# Patient Record
Sex: MALE | Race: WHITE | NOT HISPANIC OR LATINO | Employment: UNEMPLOYED | URBAN - NONMETROPOLITAN AREA
[De-identification: names, ages, dates, MRNs, and addresses within clinical notes are randomized per-mention and may not be internally consistent; named-entity substitution may affect disease eponyms.]

---

## 2017-01-21 ENCOUNTER — LAB (OUTPATIENT)
Dept: LAB | Facility: HOSPITAL | Age: 27
End: 2017-01-21
Attending: PEDIATRICS

## 2017-01-21 ENCOUNTER — TRANSCRIBE ORDERS (OUTPATIENT)
Dept: GENERAL RADIOLOGY | Facility: HOSPITAL | Age: 27
End: 2017-01-21

## 2017-01-21 DIAGNOSIS — Z11.3 ENCOUNTER FOR SCREENING FOR INFECTIONS WITH A PREDOMINANTLY SEXUAL MODE OF TRANSMISSION: ICD-10-CM

## 2017-01-21 DIAGNOSIS — Z11.3 ENCOUNTER FOR SCREENING FOR INFECTIONS WITH A PREDOMINANTLY SEXUAL MODE OF TRANSMISSION: Primary | ICD-10-CM

## 2017-01-21 PROCEDURE — 87590 N.GONORRHOEAE DNA DIR PROB: CPT | Performed by: PEDIATRICS

## 2017-01-21 PROCEDURE — 86695 HERPES SIMPLEX TYPE 1 TEST: CPT | Performed by: PEDIATRICS

## 2017-01-21 PROCEDURE — 86696 HERPES SIMPLEX TYPE 2 TEST: CPT | Performed by: PEDIATRICS

## 2017-01-21 PROCEDURE — 36415 COLL VENOUS BLD VENIPUNCTURE: CPT

## 2017-01-21 PROCEDURE — G0432 EIA HIV-1/HIV-2 SCREEN: HCPCS | Performed by: PEDIATRICS

## 2017-01-21 PROCEDURE — 87491 CHLMYD TRACH DNA AMP PROBE: CPT | Performed by: PEDIATRICS

## 2017-01-21 PROCEDURE — 80074 ACUTE HEPATITIS PANEL: CPT | Performed by: PEDIATRICS

## 2017-01-22 LAB
HAV IGM SERPL QL IA: NEGATIVE
HBV CORE IGM SERPL QL IA: NEGATIVE
HBV SURFACE AG SERPL QL IA: NEGATIVE
HCV AB SER DONR QL: NEGATIVE
HCV S/C RATIO: 0.02 (ref 0–0.99)
HIV1+2 AB SER QL: NEGATIVE

## 2017-01-24 LAB
C TRACH RRNA SPEC DONR QL NAA+PROBE: NEGATIVE
HSV1 IGG SER IA-ACNC: 38.8 INDEX (ref 0–0.9)
HSV2 IGG SER IA-ACNC: <0.91 INDEX (ref 0–0.9)
N GONORRHOEA DNA SPEC QL NAA+PROBE: NEGATIVE
RPR SER-TITR: NON REACTIVE {TITER}

## 2019-04-05 ENCOUNTER — HOSPITAL ENCOUNTER (INPATIENT)
Age: 29
LOS: 3 days | Discharge: HOME | DRG: 897 | End: 2019-04-08
Attending: FAMILY MEDICINE | Admitting: PSYCHIATRY & NEUROLOGY
Payer: MEDICAID

## 2019-04-05 DIAGNOSIS — F23 ACUTE PSYCHOSIS (HCC): Primary | ICD-10-CM

## 2019-04-05 DIAGNOSIS — F19.959 DRUG-INDUCED PSYCHOTIC DISORDER WITH COMPLICATION (HCC): ICD-10-CM

## 2019-04-05 LAB
ACETAMINOPHEN LEVEL: <15 UG/ML
ALBUMIN SERPL-MCNC: 4.2 G/DL (ref 3.5–5.2)
ALP BLD-CCNC: 81 U/L (ref 40–130)
ALT SERPL-CCNC: 15 U/L (ref 5–41)
AMPHETAMINE SCREEN, URINE: POSITIVE
ANION GAP SERPL CALCULATED.3IONS-SCNC: 12 MMOL/L (ref 7–19)
AST SERPL-CCNC: 15 U/L (ref 5–40)
BARBITURATE SCREEN URINE: NEGATIVE
BASOPHILS ABSOLUTE: 0.1 K/UL (ref 0–0.2)
BASOPHILS RELATIVE PERCENT: 1.1 % (ref 0–1)
BENZODIAZEPINE SCREEN, URINE: NEGATIVE
BILIRUB SERPL-MCNC: 0.3 MG/DL (ref 0.2–1.2)
BUN BLDV-MCNC: 19 MG/DL (ref 6–20)
CALCIUM SERPL-MCNC: 9.3 MG/DL (ref 8.6–10)
CANNABINOID SCREEN URINE: POSITIVE
CHLORIDE BLD-SCNC: 107 MMOL/L (ref 98–111)
CO2: 23 MMOL/L (ref 22–29)
COCAINE METABOLITE SCREEN URINE: NEGATIVE
CREAT SERPL-MCNC: 1 MG/DL (ref 0.5–1.2)
EOSINOPHILS ABSOLUTE: 0.2 K/UL (ref 0–0.6)
EOSINOPHILS RELATIVE PERCENT: 2.3 % (ref 0–5)
ETHANOL: <10 MG/DL (ref 0–0.08)
GFR NON-AFRICAN AMERICAN: >60
GLUCOSE BLD-MCNC: 101 MG/DL (ref 74–109)
HCT VFR BLD CALC: 45.1 % (ref 42–52)
HEMOGLOBIN: 15.4 G/DL (ref 14–18)
LYMPHOCYTES ABSOLUTE: 3.3 K/UL (ref 1.1–4.5)
LYMPHOCYTES RELATIVE PERCENT: 41.8 % (ref 20–40)
Lab: ABNORMAL
MCH RBC QN AUTO: 30.1 PG (ref 27–31)
MCHC RBC AUTO-ENTMCNC: 34.1 G/DL (ref 33–37)
MCV RBC AUTO: 88.1 FL (ref 80–94)
MONOCYTES ABSOLUTE: 0.8 K/UL (ref 0–0.9)
MONOCYTES RELATIVE PERCENT: 10.2 % (ref 0–10)
NEUTROPHILS ABSOLUTE: 3.5 K/UL (ref 1.5–7.5)
NEUTROPHILS RELATIVE PERCENT: 44.5 % (ref 50–65)
OPIATE SCREEN URINE: NEGATIVE
PDW BLD-RTO: 12.1 % (ref 11.5–14.5)
PLATELET # BLD: 235 K/UL (ref 130–400)
PMV BLD AUTO: 9.5 FL (ref 9.4–12.4)
POTASSIUM SERPL-SCNC: 4.2 MMOL/L (ref 3.5–5)
RBC # BLD: 5.12 M/UL (ref 4.7–6.1)
SALICYLATE, SERUM: <3 MG/DL (ref 3–10)
SODIUM BLD-SCNC: 142 MMOL/L (ref 136–145)
T4 TOTAL: 8.6 UG/DL (ref 4.5–11.7)
TOTAL PROTEIN: 7.2 G/DL (ref 6.6–8.7)
TSH SERPL DL<=0.05 MIU/L-ACNC: 2.85 UIU/ML (ref 0.27–4.2)
WBC # BLD: 7.9 K/UL (ref 4.8–10.8)

## 2019-04-05 PROCEDURE — G0480 DRUG TEST DEF 1-7 CLASSES: HCPCS

## 2019-04-05 PROCEDURE — 99285 EMERGENCY DEPT VISIT HI MDM: CPT | Performed by: EMERGENCY MEDICINE

## 2019-04-05 PROCEDURE — 36415 COLL VENOUS BLD VENIPUNCTURE: CPT

## 2019-04-05 PROCEDURE — 85025 COMPLETE CBC W/AUTO DIFF WBC: CPT

## 2019-04-05 PROCEDURE — 84443 ASSAY THYROID STIM HORMONE: CPT

## 2019-04-05 PROCEDURE — 80053 COMPREHEN METABOLIC PANEL: CPT

## 2019-04-05 PROCEDURE — 80307 DRUG TEST PRSMV CHEM ANLYZR: CPT

## 2019-04-05 PROCEDURE — 6370000000 HC RX 637 (ALT 250 FOR IP): Performed by: FAMILY MEDICINE

## 2019-04-05 PROCEDURE — 84436 ASSAY OF TOTAL THYROXINE: CPT

## 2019-04-05 PROCEDURE — 1240000000 HC EMOTIONAL WELLNESS R&B

## 2019-04-05 PROCEDURE — 99285 EMERGENCY DEPT VISIT HI MDM: CPT

## 2019-04-05 RX ORDER — OLANZAPINE 10 MG/1
10 TABLET, ORALLY DISINTEGRATING ORAL ONCE
Status: COMPLETED | OUTPATIENT
Start: 2019-04-05 | End: 2019-04-05

## 2019-04-05 RX ORDER — ACETAMINOPHEN 325 MG/1
650 TABLET ORAL EVERY 4 HOURS PRN
Status: DISCONTINUED | OUTPATIENT
Start: 2019-04-05 | End: 2019-04-08 | Stop reason: HOSPADM

## 2019-04-05 RX ORDER — ZIPRASIDONE MESYLATE 20 MG/ML
20 INJECTION, POWDER, LYOPHILIZED, FOR SOLUTION INTRAMUSCULAR ONCE
Status: DISCONTINUED | OUTPATIENT
Start: 2019-04-05 | End: 2019-04-05

## 2019-04-05 RX ORDER — LORAZEPAM 2 MG/ML
2 INJECTION INTRAMUSCULAR EVERY 6 HOURS PRN
Status: DISCONTINUED | OUTPATIENT
Start: 2019-04-05 | End: 2019-04-08 | Stop reason: HOSPADM

## 2019-04-05 RX ORDER — OLANZAPINE 10 MG/1
10 TABLET, ORALLY DISINTEGRATING ORAL NIGHTLY
Status: DISCONTINUED | OUTPATIENT
Start: 2019-04-05 | End: 2019-04-05

## 2019-04-05 RX ORDER — ZIPRASIDONE MESYLATE 20 MG/ML
10 INJECTION, POWDER, LYOPHILIZED, FOR SOLUTION INTRAMUSCULAR EVERY 6 HOURS PRN
Status: DISCONTINUED | OUTPATIENT
Start: 2019-04-05 | End: 2019-04-08 | Stop reason: HOSPADM

## 2019-04-05 RX ORDER — OLANZAPINE 10 MG/1
TABLET, ORALLY DISINTEGRATING ORAL
Status: DISCONTINUED
Start: 2019-04-05 | End: 2019-04-05

## 2019-04-05 RX ADMIN — OLANZAPINE 10 MG: 10 TABLET, ORALLY DISINTEGRATING ORAL at 05:07

## 2019-04-05 ASSESSMENT — ENCOUNTER SYMPTOMS
WHEEZING: 0
NAUSEA: 0
SORE THROAT: 0
SHORTNESS OF BREATH: 0
ABDOMINAL PAIN: 0
DIARRHEA: 0
COLOR CHANGE: 0
COUGH: 0
VOMITING: 0
BACK PAIN: 0

## 2019-04-05 NOTE — ED PROVIDER NOTES
[facility]  eMERGENCYdEPARTMENT eNCOUnter      Pt Name: Queen Don  MRN: [id number]  [place] 1990  Date of evaluation: 4/5/2019  Shawn Buchanan MD    Emergency Department care of this patient was assumed at 0700 from Dr. Arnol Zavala. We have discussed the case and the plan of care. I have seen and evaluated patient and reviewed ED course. CHIEF COMPLAINT       Chief Complaint   Patient presents with    Manic Behavior    Psychiatric Evaluation    Paranoid     On meth, paranoid, grandiose thoughts, assuming care from Dr. Arnol Zavala, pending mental health eval    PHYSICAL EXAM    (up to 7 for level 4, 8 or more for level 5)     ED Triage Vitals   BP Temp Temp src Pulse Resp SpO2 Height Weight   04/05/19 0506 04/05/19 0506 -- 04/05/19 0506 04/05/19 0506 04/05/19 0506 04/05/19 0502 04/05/19 0502   (!) 147/88 97.2 °F (36.2 °C)  97 22 97 % 5' 11\" (1.803 m) 150 lb (68 kg)       Physical Exam   Constitutional: He appears well-developed and well-nourished. No distress. sleeping   Vitals reviewed. I did not re examine the patient    DIAGNOSTIC RESULTS         No orders to display           LABS:  Labs Reviewed   CBC WITH AUTO DIFFERENTIAL - Abnormal; Notable for the following components:       Result Value    Neutrophils % 44.5 (*)     Lymphocytes % 41.8 (*)     Monocytes % 10.2 (*)     Basophils % 1.1 (*)     All other components within normal limits   URINE DRUG SCREEN - Abnormal; Notable for the following components:    Amphetamine Screen, Urine Positive (*)     Cannabinoid Scrn, Ur Positive (*)     All other components within normal limits   COMPREHENSIVE METABOLIC PANEL   ETHANOL   ACETAMINOPHEN LEVEL   T4   SALICYLATE LEVEL   TSH WITHOUT REFLEX       All other labs were within normal range or not returned as of this dictation.     EMERGENCY DEPARTMENT COURSE and DIFFERENTIAL DIAGNOSIS/MDM:   Vitals:    Vitals:    04/05/19 0502 04/05/19 0506   BP:  (!) 147/88   Pulse:  97   Resp:  22   Temp:  97.2 °F (36.2 °C)   SpO2:  97%   Weight: 150 lb (68 kg)    Height: 5' 11\" (1.803 m)        MDM  Number of Diagnoses or Management Options  Acute psychosis ([facility].):   Drug-induced psychotic disorder with complication [facility]):      Amount and/or Complexity of Data Reviewed  Clinical lab tests: reviewed  Discuss the patient with other providers: yes      Methamphetamine induced psychosis, pt sleeping currently however when [facility] tried to evaluate very disorganized, 0748    [facility] to have Dr. Dong Gauthier evaluate in ED 5139    Dr. Dong Gauthier evaluated in ED ok to admit here, pt placed on 72hr hold      CONSULTS:  [place]:  Unless otherwise noted below, none     Procedures    FINAL IMPRESSION      1. Acute psychosis ([facility]    2. Drug-induced psychotic disorder with complication ([facility]          DISPOSITION/PLAN   DISPOSITION     PATIENT REFERRED TO:  No follow-up provider specified.     DISCHARGE MEDICATIONS:  New Prescriptions    No medications on file          (Please note that portions ofthis note were completed with a voice recognition program.  Efforts were made to edit the dictations but occasionally words are mis-transcribed.)    Eliel Hollis MD(electronically signed)  Attending Emergency Physician         Hazel Montano MD  04/05/19 1326

## 2019-04-05 NOTE — ED PROVIDER NOTES
[facility] 6 ADULT [facility]  eMERGENCY dEPARTMENT eNCOUnter      Pt Name: Nimco Ramirez  MRN: [id number]  [place] 1990  Date of evaluation: 4/5/2019  Provider: MD Raj Webster       Chief Complaint   Patient presents with    Manic Behavior    Psychiatric Evaluation    Paranoid         HISTORY OF PRESENT ILLNESS   (Location/Symptom, Timing/Onset,Context/Setting, Quality, Duration, Modifying Factors, Severity)  Note limiting factors. Nimco Ramriez is a 29 y.o. male who presents to the emergency department      Patient presents and agitated manic state he has rapid speech and is getting that he is very paranoid          NursingNotes were reviewed. REVIEW OF SYSTEMS    (2-9 systems for level 4, 10 or more for level 5)     Review of Systems   Constitutional: Positive for fatigue. Negative for activity change, appetite change, chills and fever. HENT: Negative for nosebleeds, postnasal drip and sore throat. Respiratory: Negative for cough, shortness of breath and wheezing. Cardiovascular: Negative for chest pain. Gastrointestinal: Negative for abdominal pain, diarrhea, nausea and vomiting. Genitourinary: Negative for dysuria. Musculoskeletal: Negative for back pain. Skin: Negative for color change. Neurological: Negative for weakness and headaches. Psychiatric/Behavioral: Positive for decreased concentration, dysphoric mood and sleep disturbance. The patient is nervous/anxious. PAST MEDICALHISTORY     Past Medical History:   Diagnosis Date    ADHD          SURGICAL HISTORY     No past surgical history on file. CURRENT MEDICATIONS     There are no discharge medications for this patient. ALLERGIES     Pcn [penicillins]    FAMILY HISTORY     No family history on file.        SOCIAL HISTORY       Social History     Socioeconomic History    Marital status: Single     Spouse name: Not on file    Number of children: Not on file    Years of education: Not on file    Highest education level: Not on file   Occupational History    Not on file   Social Needs    Financial resource strain: Not on file    Food insecurity:     Worry: Not on file     Inability: Not on file    Transportation needs:     Medical: Not on file     Non-medical: Not on file   Tobacco Use    Smoking status: Former Smoker    Smokeless tobacco: Never Used   Substance and Sexual Activity    Alcohol use: Not Currently    Drug use: Yes     Types: Methamphetamines, Marijuana    Sexual activity: Not on file   Lifestyle    Physical activity:     Days per week: Not on file     Minutes per session: Not on file    Stress: Not on file   Relationships    Social connections:     Talks on phone: Not on file     Gets together: Not on file     Attends Buddhist service: Not on file     Active member of club or organization: Not on file     Attends meetings of clubs or organizations: Not on file     Relationship status: Not on file    Intimate partner violence:     Fear of current or ex partner: Not on file     Emotionally abused: Not on file     Physically abused: Not on file     Forced sexual activity: Not on file   Other Topics Concern    Not on file   Social History Narrative    Not on file       SCREENINGS    [place] Coma Scale  Eye Opening: Spontaneous  Best Verbal Response: Oriented  Best Motor Response: Obeys commands  Anne Coma Scale Score: 15        PHYSICAL EXAM    (up to 7 for level 4, 8 or more for level 5)     ED Triage Vitals   BP Temp Temp src Pulse Resp SpO2 Height Weight   -- -- -- -- -- -- -- --       Physical Exam   Constitutional: He appears well-developed and well-nourished. HENT:   Head: Normocephalic. Neck: Normal range of motion. Cardiovascular: Normal rate and normal heart sounds. Pulmonary/Chest: Effort normal and breath sounds normal.   Abdominal: Soft. Bowel sounds are normal.   Neurological: He is alert. No cranial nerve deficit.    Psychiatric: His mood appears anxious. His speech is rapid and/or pressured and tangential. He is agitated and hyperactive. Thought content is paranoid and delusional. He expresses impulsivity and inappropriate judgment. He expresses no suicidal ideation. DIAGNOSTIC RESULTS     EKG: All EKG's areinterpreted by the Emergency Department Physician who either signs or Co-signs this chart in the absence of a cardiologist.        RADIOLOGY:  Non-plain film images such as CT, Ultrasound and MRI are read by the radiologist. Plain radiographic images are visualized and preliminarily interpreted bythe emergency physician with the below findings:          No orders to display           LABS:  Labs Reviewed   CBC WITH AUTO DIFFERENTIAL - Abnormal; Notable for the following components:       Result Value    Neutrophils % 44.5 (*)     Lymphocytes % 41.8 (*)     Monocytes % 10.2 (*)     Basophils % 1.1 (*)     All other components within normal limits   URINE DRUG SCREEN - Abnormal; Notable for the following components:    Amphetamine Screen, Urine Positive (*)     Cannabinoid Scrn, Ur Positive (*)     All other components within normal limits   VITAMIN D 25 HYDROXY - Abnormal; Notable for the following components:    Vit D, 25-Hydroxy 28.1 (*)     All other components within normal limits   LIPID PANEL - Abnormal; Notable for the following components:    Cholesterol, Total 143 (*)     HDL 40 (*)     All other components within normal limits   COMPREHENSIVE METABOLIC PANEL   ETHANOL   ACETAMINOPHEN LEVEL   T4   SALICYLATE LEVEL   TSH WITHOUT REFLEX   VITAMIN B12   HEMOGLOBIN A1C       All other labs were within normal range or not returned as of this dictation.     EMERGENCY DEPARTMENT COURSE and DIFFERENTIAL DIAGNOSIS/MDM:   Vitals:    Vitals:    04/06/19 2032 04/07/19 0824 04/07/19 2009 04/08/19 0755   BP: 118/65 131/63 125/68 121/62   Pulse: 81 60 84 69   Resp: 20 18 20 20   Temp: 97.7 °F (36.5 °C) 97.2 °F (36.2 °C) 98.1 °F (36.7 °C) 97.4 °F (36.3 °C) TempSrc: Temporal Temporal Temporal Temporal   SpO2: 99% 98% 97% 100%   Weight:       Height:           MDM    Reassessment  6:10 AM patient is continuing to escalate he is now cursing and having more delusions of grandeur with Amish persecution the Zyprexa has not seem to help, I have ordered a Geodon shot    CONSULTS:  IP CONSULT TO FAMILY MEDICINE    PROCEDURES:  Unless otherwise noted below, none     Procedures    FINAL IMPRESSION      1. Acute psychosis ([address]    2. Drug-induced psychotic disorder with complication [facility])          DISPOSITION/PLAN   DISPOSITION Decision To Admit 04/05/2019 09:55:26 AM      PATIENT REFERRED TO:  Emily Sanderson  [address] [place] [address]  [phone]    In 1 week  on follow up with your PCP, please review labs/imaging done during this Hospital stay, and discuss any additonal/repeat testing or treatment needed with your PCP    [address]   Phone [phone]   Fax [phone]   CRISIS LINE: [phone]    Go on 4/16/2019  Intake/therapy appt with Barbara Duarte at 9:00am, please bring a photo id, soc sec card, insurance card and $3.00 copay    [facility] [address]   Phone [phone]   Fax [phone]   CRISIS LINE: [phone]    Go on 4/25/2019  Medication management appt with Lew at 3:30pm, please bring a current list of medications       DISCHARGE MEDICATIONS:  There are no discharge medications for this patient.          (Please note that portions of this note were completed with a voice recognition program.  Efforts were made to edit thedictations but occasionally words are mis-transcribed.)    Blaine Wayne MD (electronically signed)  Attending Emergency Physician         Arnaud Russo MD  04/10/19 5172

## 2019-04-05 NOTE — ED NOTES
[facility]  04/05/19 0630

## 2019-04-05 NOTE — PLAN OF CARE
Group Therapy Note     Date: 4/5/2019  Start Time: 0013  End Time:  1600  Number of Participants: 8     Type of Group: Recovery     Wellness Binder Information  Module Name:  relapse prevention  Session Number:  4     Patient's Goal:  relapse prevention toolbox     Notes:  pt was verbally prompted to attend group. Pt refused. Information about relapse prevention was provided. Status After Intervention:       Participation Level:      Participation Quality:         Speech:           Thought Process/Content:         Affective Functioning:         Mood:         Level of consciousness:          Response to Learning:         Endings:      Modes of Intervention:         Discipline Responsible: Psychoeducational Specialist        Signature:  Lucy Barrett

## 2019-04-05 NOTE — ED NOTES
Pt agitated, talking rapidly, states people are after him.  Pt has bible with him     Chad Gonzalez RN  04/05/19 0981

## 2019-04-05 NOTE — ED NOTES
Pt placed in paper scrubs. Personal belongs removed and given to security. Suicidal Flowsheet Initiated. Called for a sitter.   securiity at bedside      Javier Alas RN  04/05/19 2678

## 2019-04-06 PROBLEM — F15.20 METHAMPHETAMINE DEPENDENCE (HCC): Status: ACTIVE | Noted: 2019-04-06

## 2019-04-06 PROCEDURE — 6370000000 HC RX 637 (ALT 250 FOR IP): Performed by: PSYCHIATRY & NEUROLOGY

## 2019-04-06 PROCEDURE — 1240000000 HC EMOTIONAL WELLNESS R&B

## 2019-04-06 PROCEDURE — 99222 1ST HOSP IP/OBS MODERATE 55: CPT | Performed by: PSYCHIATRY & NEUROLOGY

## 2019-04-06 RX ORDER — RISPERIDONE 0.5 MG/1
0.5 TABLET, FILM COATED ORAL 2 TIMES DAILY
Status: DISCONTINUED | OUTPATIENT
Start: 2019-04-06 | End: 2019-04-08 | Stop reason: HOSPADM

## 2019-04-06 RX ORDER — BENZTROPINE MESYLATE 0.5 MG/1
0.5 TABLET ORAL 2 TIMES DAILY
Status: DISCONTINUED | OUTPATIENT
Start: 2019-04-06 | End: 2019-04-08 | Stop reason: HOSPADM

## 2019-04-06 RX ADMIN — BENZTROPINE MESYLATE 0.5 MG: 0.5 TABLET ORAL at 11:30

## 2019-04-06 RX ADMIN — RISPERIDONE 0.5 MG: 0.5 TABLET, FILM COATED ORAL at 21:02

## 2019-04-06 RX ADMIN — RISPERIDONE 0.5 MG: 0.5 TABLET, FILM COATED ORAL at 11:30

## 2019-04-06 RX ADMIN — BENZTROPINE MESYLATE 0.5 MG: 0.5 TABLET ORAL at 21:02

## 2019-04-06 NOTE — PLAN OF CARE
Group Therapy Note     Date: 4/6/2019  Start Time: 1500  End Time:  1316  Number of Participants: 10     Type of Group: Cognitive Skills     Wellness Binder Information  Module Name:  Physical Wellness  Session Number:  1     Patient's Goal:  Patient will identify certain strengths and discuss how they are used effectively. Patient's note:  Patient did not attend group meeting today. Nursing staff notified and patient was encouraged to attend future meetings.       Notes:  Patient Discipline Responsible: /Counselor        Signature:  MARAL Wiggins

## 2019-04-06 NOTE — BH NOTE
Patient has been admitted from emergency department to psychiatric unit today. Patient has been anxious, aggressive and agitated to emergency department and got one dose of the Zyprexa 10 mg, and then he has been transferred to psychiatric unit. When patient arrived to psychiatric unit he was over sedate, unable to participate in interview, went to bed and fell asleep. Due to patient's oversedation it was not possible to perform good level of the psychiatric evaluation. Geodon 10 mg every 6 hours and lorazepam 2 mg every 6 hours for anxiety PRN have been ordered.
Allergic to PENICILLIN. His history is reviewed. The  patient is generally healthy aside from the above symptoms. He does not  have a history of hepatitis or HIV. FAMILY HISTORY:  Mother used cannabis, father has learning disability,  several relatives used drugs, an uncle committed suicide. PERSONAL HISTORY:  From [place], [place]. Denies juvenile offences,  but got into trouble quite a bit in elementary school, sat in the [employer] a  lot, he says. Got his GED. He has a son, but did not wish to go into  detail apparently. He was living with his mother before she expelled  him from her house. REVIEW OF SYSTEMS:  The 10-item review of systems is positive for  insomnia, jitteriness, no headaches or other neurologic signs. MENTAL STATUS EXAMINATION:  A remarkable examination. He was aroused  from a drowsy state and did not cooperate in coming to the office. He  was somewhat drowsy at times, but he demonstrated rapid speech. His  articulation was mumbled and he appeared distracted. He had a strange  body odor, wondering if it is from the meth. Thought content is  abnormal.  Feels some people are taking interest in him and are  following him with bad intent. He also feels that these people are  playing mind games with him unspecified. When he mentioned mind games,  he became very agitated and actually grabbed his genital area. He was  oriented to the month and to the day, but not to the date. He knew the  year. He denied suicidal or homicidal plan or intent, and he agreed to  talk to a nurse if he feels threatened here on the unit. DIAGNOSES:  Methamphetamine dependence with induced paranoia, cannabis  use as well. PLAN:  We will initiate treatment with risperidone and with Cogentin  prophylaxis for EPS. He is at very high risk second only to young black  male. Monitor for signs of agitation and abnormal perceptions. Placed  on assault precautions.   Have available ziprasidone and

## 2019-04-06 NOTE — PLAN OF CARE
Group Therapy Note     Date: 4/6/2019  Start Time: 1000  End Time:  6101  Number of Participants: 9     Type of Group: Psychoeducation     Wellness Binder Information  Module Name:  Physical Wellness  Session Number:  1     Patient's Goal:  Patient will be able to participate in healthy exercises. Patient will verbalize own needs for healthy lifestyle. Notes:  Patient did not attend group meeting today. Nursing staff notified and patient was encouraged to attend future meetings.          Discipline Responsible: /Counselor        Signature:  MARAL Agosto

## 2019-04-07 LAB
CHOLESTEROL, TOTAL: 143 MG/DL (ref 160–199)
HBA1C MFR BLD: 5.5 % (ref 4–6)
HDLC SERPL-MCNC: 40 MG/DL (ref 55–121)
LDL CHOLESTEROL CALCULATED: 87 MG/DL
TRIGL SERPL-MCNC: 78 MG/DL (ref 0–149)
VITAMIN B-12: 447 PG/ML (ref 211–946)
VITAMIN D 25-HYDROXY: 28.1 NG/ML

## 2019-04-07 PROCEDURE — 80061 LIPID PANEL: CPT

## 2019-04-07 PROCEDURE — 6370000000 HC RX 637 (ALT 250 FOR IP): Performed by: PSYCHIATRY & NEUROLOGY

## 2019-04-07 PROCEDURE — 36415 COLL VENOUS BLD VENIPUNCTURE: CPT

## 2019-04-07 PROCEDURE — 83036 HEMOGLOBIN GLYCOSYLATED A1C: CPT

## 2019-04-07 PROCEDURE — 82607 VITAMIN B-12: CPT

## 2019-04-07 PROCEDURE — 82306 VITAMIN D 25 HYDROXY: CPT

## 2019-04-07 PROCEDURE — 1240000000 HC EMOTIONAL WELLNESS R&B

## 2019-04-07 RX ADMIN — RISPERIDONE 0.5 MG: 0.5 TABLET, FILM COATED ORAL at 20:45

## 2019-04-07 RX ADMIN — BENZTROPINE MESYLATE 0.5 MG: 0.5 TABLET ORAL at 08:55

## 2019-04-07 RX ADMIN — BENZTROPINE MESYLATE 0.5 MG: 0.5 TABLET ORAL at 20:45

## 2019-04-07 RX ADMIN — RISPERIDONE 0.5 MG: 0.5 TABLET, FILM COATED ORAL at 08:55

## 2019-04-07 ASSESSMENT — LIFESTYLE VARIABLES: HISTORY_ALCOHOL_USE: YES

## 2019-04-07 NOTE — PLAN OF CARE
Problem: Altered Mood, Manic Behavior:  Goal: Able to sleep  Description  Able to sleep  Outcome: Ongoing  Goal: Able to verbalize decrease in frequency and intensity of racing thoughts  Description  Able to verbalize decrease in frequency and intensity of racing thoughts  Outcome: Ongoing  Goal: Ability to disclose and discuss suicidal ideas will improve  Description  Ability to disclose and discuss suicidal ideas will improve  Outcome: Ongoing  Goal: Absence of self-harm  Description  Absence of self-harm  Outcome: Ongoing  Goal: Ability to achieve adequate nutritional intake will improve  Description  Ability to achieve adequate nutritional intake will improve  Outcome: Ongoing  Goal: Ability to interact with others will improve  Description  Ability to interact with others will improve  Outcome: Ongoing  Goal: Ability to demonstrate self-control will improve  Description  Ability to demonstrate self-control will improve  Outcome: Ongoing  Goal: Mood stable  Description  Mood stable  Outcome: Ongoing  Goal: Patient specific goal  Description  Patient specific goal  Outcome: Ongoing     Problem: Altered Mood, Psychotic Behavior:  Goal: Able to demonstrate trust by eating, participating in treatment and following staff's direction  Description  Able to demonstrate trust by eating, participating in treatment and following staff's direction  Outcome: Ongoing  Goal: Able to verbalize decrease in frequency and intensity of hallucinations  Description  Able to verbalize decrease in frequency and intensity of hallucinations  Outcome: Ongoing  Goal: Able to verbalize reality based thinking  Description  Able to verbalize reality based thinking  Outcome: Ongoing  Goal: Absence of self-harm  Description  Absence of self-harm  Outcome: Ongoing  Goal: Ability to achieve adequate nutritional intake will improve  Description  Ability to achieve adequate nutritional intake will improve  Outcome: Ongoing  Goal: Ability to interact with others will improve  Description  Ability to interact with others will improve  Outcome: Ongoing  Goal: Compliance with prescribed medication regimen will improve  Description  Compliance with prescribed medication regimen will improve  Outcome: Ongoing  Goal: Patient specific goal  Description  Patient specific goal  Outcome: Ongoing     Problem: Substance Abuse:  Goal: Absence of drug withdrawal signs and symptoms  Description  Absence of drug withdrawal signs and symptoms  Outcome: Ongoing  Goal: Participates in care planning  Description  Participates in care planning  Outcome: Ongoing  Goal: Patient specific goal  Description  Patient specific goal  Outcome: Ongoing

## 2019-04-08 VITALS
WEIGHT: 150 LBS | SYSTOLIC BLOOD PRESSURE: 121 MMHG | DIASTOLIC BLOOD PRESSURE: 62 MMHG | HEIGHT: 71 IN | BODY MASS INDEX: 21 KG/M2 | RESPIRATION RATE: 20 BRPM | OXYGEN SATURATION: 100 % | HEART RATE: 69 BPM | TEMPERATURE: 97.4 F

## 2019-04-08 PROCEDURE — 6370000000 HC RX 637 (ALT 250 FOR IP): Performed by: PSYCHIATRY & NEUROLOGY

## 2019-04-08 PROCEDURE — 99239 HOSP IP/OBS DSCHRG MGMT >30: CPT | Performed by: PSYCHIATRY & NEUROLOGY

## 2019-04-08 RX ADMIN — RISPERIDONE 0.5 MG: 0.5 TABLET, FILM COATED ORAL at 09:40

## 2019-04-08 RX ADMIN — BENZTROPINE MESYLATE 0.5 MG: 0.5 TABLET ORAL at 09:40

## 2019-04-08 NOTE — PLAN OF CARE
Group Therapy Note     Date: 4/8/2019  Start Time: 1100  End Time:  0313  Number of Participants: 12     Type of Group: Psychoeducation     Wellness Binder Information  Module Name:  emotional wellness  Session Number:  1     Patient's Goal:  validation of feelings     Notes:  pt acknowledged to have feelings validated it may be necessary to share feelings with others.      Status After Intervention:  Improved     Participation Level: Interactive     Participation Quality: Appropriate, Attentive and Sharing        Speech:  normal        Thought Process/Content: Logical        Affective Functioning: Congruent        Mood: congruent        Level of consciousness:  Alert, Oriented x4 and Attentive        Response to Learning: Able to verbalize current knowledge/experience        Endings: None Reported     Modes of Intervention: Education        Discipline Responsible: Psychoeducational Specialist        Signature:  Addie Sotomayor

## 2019-04-08 NOTE — GROUP NOTE
Group Therapy Note    Date: April 8    Group Start Time: 1000  Group End Time: 8284  Group Topic: Psychotherapy    [facility]ULT [facility]    [facility]                                                                        Group Therapy Note    Date: 4/8/2019  Start Time: 1000  End Time:  1045  Number of Participants: 12    Type of Group: Psychotherapy    Patient's Goal: Patient will process emotions and actions and how to relate to other or their with others. Patient discussed open communication and feelings and emotions. Notes:  Patient attended process group as scheduled, patient identified a issue to work on today regarding how they will interact and relate to others. Status After Intervention:  Improved    Participation Level:  Active Listener    Participation Quality: Appropriate, Attentive and Sharing      Speech:  normal      Thought Process/Content: Logical      Affective Functioning: Congruent      Mood: euthymic      Level of consciousness:  Alert      Response to Learning: Able to verbalize current knowledge/experience      Endings: None Reported    Modes of Intervention: Education, Support and Socialization      Discipline Responsible: /Counselor      Signature:  Timmy [facility]

## 2019-04-08 NOTE — DISCHARGE SUMMARY
probation for  receiving stolen goods and destruction of jail property by lighting a  cigarette in half-way.     Hospital Course: Patient was admitted to the adult-psych behavioral health floor and was acclimated to the floor. Labs were reviewed and physical exam was completed by Dr. Sabino Naranjo and associates. Home medications were reconciled. ALKA was obtained and reviewed. Medication changes were made and patient tolerated well with no side effects. During the hospital stay patient has been started on risperidone 0.5 mg BID for psychosis and Cogentin 0.5 mg BID for prevention EPS. Patient did not attend group activities in the unit. He stayed in his room isolated, left the room only for medications or meals. Behaviorally he was not a problem. Patient was compliant with his medications. Patient was sleeping through the night. This patient is not suicidal, homicidal or psychotic at discharge. He does not present a danger to self or others. With the above mentioned medications changes as well as psychotherapeutic interventions, the patient reported considerable improvement in his condition. On 04/08/19 it was therefore decided to discharge the patient, as it was felt that he received maximal benefit from his hospitalization.     Number of antipsychotic medication prescribed at discharge: None  IF MORE THAN ONE IS USED: NA    History of greater than 3 failed multiple monotherapy trials: NA  Monotherapy taper plan/ cross taper in progress: NA  Augmentation of Clozapine: NA    Referral to addiction treatment: Patient has been provided with information about rehabilitation facilities the area    Prescription for Alcohol or Drug Disorder Medication: Patient refused    Prescription for Tobacco Cessation medication: Patient refused    If no prescriptions for Tobacco Cessation must document why: Patient refused    Consults: Internal medicine    Significant [facility]   [facility]  Lab Results   Component Value Date  04/05/2019    K 4.2 04/05/2019     04/05/2019    CO2 23 04/05/2019    BUN 19 04/05/2019    CREATININE 1.0 04/05/2019    GLUCOSE 101 04/05/2019    CALCIUM 9.3 04/05/2019    PROT 7.2 04/05/2019    LABALBU 4.2 04/05/2019    BILITOT 0.3 04/05/2019    ALKPHOS 81 04/05/2019    AST 15 04/05/2019    ALT 15 04/05/2019    LABGLOM >60 04/05/2019       Lab Results   Component Value Date    TSH 2.850 04/05/2019     Lab Results   Component Value Date    TJHPMNDZ81 447 04/07/2019     Lab Results   Component Value Date    VITD25 28.1 (L) 04/07/2019       Treatments: therapies: RN and SW    Alert, Oriented X 4  Appearance:  Proper Grooming and Hygiene  Speech with Regular Rate and Rhythm  Eye Contact:  Good  No Psychomotor Agitation/Retardation Noted  Attitude:  Cooperative  Mood:  \"Good\"  Affective: Congruent, appropriate to the situation, with a normal range and intensity  Thought Processes:  Coherently communicated, logical and goal oriented  Thought Content:  No Suicidal Ideation, No Homicidal Ideation, No Auditory or Visual Hallucinations, NO Overt Delusions  Insight:  Present  Judgement:  Normal  Memory is intact for both remote and recent  Intellectual Functioning:  Within the [employer] of Knowledge:  Adequate  Attention and Concentration:  Adequate      Discharge Exam:  Please, see medical note    Disposition: home    Patient Instructions: There are no discharge medications for this patient. Activity: activity as tolerated  Diet: regular diet  Wound Care: none needed    Follow-up with PCP in 4 weeks.     Time worked: More than 31 minutes    Participation: good    Electronically signed by Ethel Bess MD on 4/8/2019 at 11:43 AM

## 2019-04-09 ENCOUNTER — HOSPITAL ENCOUNTER (EMERGENCY)
Facility: HOSPITAL | Age: 29
Discharge: HOME | End: 2019-04-09
Attending: EMERGENCY MEDICINE | Admitting: EMERGENCY MEDICINE

## 2019-04-09 ENCOUNTER — APPOINTMENT (OUTPATIENT)
Dept: GENERAL RADIOLOGY | Facility: HOSPITAL | Age: 29
End: 2019-04-09

## 2019-04-09 VITALS
RESPIRATION RATE: 16 BRPM | OXYGEN SATURATION: 100 % | HEIGHT: 71 IN | BODY MASS INDEX: 21.7 KG/M2 | WEIGHT: 155 LBS | SYSTOLIC BLOOD PRESSURE: 142 MMHG | TEMPERATURE: 98.2 F | HEART RATE: 120 BPM | DIASTOLIC BLOOD PRESSURE: 85 MMHG

## 2019-04-09 DIAGNOSIS — M25.562 ACUTE PAIN OF LEFT KNEE: ICD-10-CM

## 2019-04-09 DIAGNOSIS — F41.9 ANXIETY: Primary | ICD-10-CM

## 2019-04-09 PROCEDURE — 73562 X-RAY EXAM OF KNEE 3: CPT

## 2019-04-09 PROCEDURE — 99283 EMERGENCY DEPT VISIT LOW MDM: CPT

## 2019-04-09 RX ORDER — HYDROXYZINE HYDROCHLORIDE 50 MG/ML
25 INJECTION, SOLUTION INTRAMUSCULAR ONCE
Status: DISCONTINUED | OUTPATIENT
Start: 2019-04-09 | End: 2019-04-09 | Stop reason: HOSPADM

## 2019-04-10 NOTE — PROGRESS NOTES
Admission Note      Reason for admission/Target Symptom: Patient admitted to [facility] due to Pt agitated, talking rapidly, states people are after him. Pt has bible with him      Diagnoses: Methamphetamine dependence with induced paranoia, cannabis  use as well. UDS: Amphetamine, Cannabinoid,    BAL:      SW met with treatment team to discuss patient's treatment including care planning, discharge planning, and follow-up needs. Pt has been admitted to [facility]. Treatment team has identified patient's discharge needs as medication management and outpatient therapy/counseling. Pt confirmed  the need for ongoing treatment post inpatient stay. Pt was also provided a handout of contact information for drug and alcohol treatment centers and other community support service such as [facility], AA, and [facility] .
BHI Admission From ED  Nursing Admission Note              There is no problem list on file for this patient. Pt admitted from Dr. Radha wallis in ED to ADULT [facility] room # 608 1. Arrived on unit via [facility] with [address] RN escort. Pt appropriately attired in paper scrubs. Body assessment completed by [facility] RN AND [facility] with no contraband discovered. All tubes, lines, and drains were appropriately discontinued by ED staff prior to pt transfer to [facility]. Pt belongings and valuables inventoried and cataloged, stored per policy. Pt oriented to surroundings, program expectations, and copy pt rights given. Received admit packet: [address], Visitation Info, Fall Prevention, Restraints Info. Consents reviewed, signed Pt Rights, Handbook Acceptance, Visit/Call Acceptance, PHI Release, Social Info Release, and Treatment Agreement. Pt verbalizes understanding.
BHI Daily Shift Assessment  Nursing Progress Note    Room: [facility]/608-01 Name: Nimco Ramirez Age: 29 y.o.     Gender: male   Dx: <principal problem not specified>  Precautions: suicide risk assault  Target Symptoms:   Accu-Chek: NoSleep: No,Sleep Quality Good SI no plan AV Denies [address] [place] [address]  ADLs: No Speech: normal Depression: 6 Anxiety: 6   Participation LevelMinimal  Visitation: No   Participation QualityResistant    Electronically signed by Toni Mayorga RN on 4/6/2019 at 10:56 AM
BHI Daily Shift Assessment  Nursing Progress Note    Room: [facility]/608-01 Name: Faustina Ulloa Age: 29 y.o.     Gender: male   Dx: substance abuse with psychosis  Precautions: suicide risk and assault precautions  Target Symptoms:   Accu-Chek: NoSleep: Yes,Sleep Quality Good SI no plan AV denies [address] [place] [address]  ADLs: No Speech: pressured Depression: 8 Anxiety: 7   Participation LevelActive Listener  Visitation: Yes   Participation QualityAppropriate and Attentive    Complaints:    Notes:     Signature:  Le Rodas RN
BHI Daily Shift Assessment  Nursing Progress Note    Room: [facility]/608-01 Name: Rubi Barrett Age: 29 y.o.    Ethnicity:  Gender: male   Dx: <principal problem not specified>  Precautions: suicide risk  CPAP: No Accu-Chek: No  MSE:  Status and Exam  Normal: No  Facial Expression: Avoids Gaze, Flat, Expressionless  Affect: Blunt  Level of Consciousness: Alert  Mood:Normal: No  Mood: Suspicious, Anxious  Motor Activity:Normal: No  Motor Activity: Decreased  Interview Behavior: Cooperative  Preception: [place] to Person, [place] to Time, [place] to Place, [place] to Situation  Attention:Normal: No  Attention: Distractible  Thought Processes: Circumstantial  Thought Content:Normal: No  Thought Content: Preoccupations  Hallucinations: None  Delusions: No  Memory:Normal: No  Memory: Poor Recent, Poor Remote  Insight and Judgment: No  Insight and Judgment: Poor Insight, Poor Judgment, Unmotivated  Present Suicidal Ideation: No  Present Homicidal Ideation: No  Sleep: Yes, Very good, no sleep issues Hours Slept: Unknown  Other PRN Meds: No Med Compliant: Yes Appetite: no change from normal Percent Meals: 100% Social: Yes ADLs: No Speech: normal Depression: 2 Anxiety: 5    Asim MondayESAU
CSW completed psychosocial and CSSR-S on this date. Pt long and short term goals discussed. Pt voiced understanding. Treatment plan sheet signed. Pt verbalized understanding of the treatment plan. Pt participated in goals and objectives of the treatment plan. It was identified that pt will require outpatient follow up appointments at local [facility] behavioral health facility such as[address]. Pt validated need for appointments. Pt was also provided a handout of contact information for drug and alcohol treatment centers and other community support service such as [facility] and [employer].       In the last 6 months has the pt been danger to self: YES  In the last 6 months has the pt been danger to others:  NO     Provided pt with [employer] handout entitled \"Quitting Smoking,\" reviewed handout with pt addressing dangers of smoking, developing coping skills, and providing basic information about quitting.      Patient declined practical counseling of tobacco practical counseling during the hospital stay
[facility] Adult Unit Daily Assessment  Nursing Progress Note    Room: [facility]/608-01   Name: Gregg Real   Age: 29 y.o. Gender: male   Dx: <principal problem not specified>  Precautions: suicide risk  Inpatient Status: involuntary       Sleep: Yes,   Sleep Quality Good   Hours Slept: 9   Sleep Medications: No  PRN Sleep Meds: No       Other PRN Meds: No   Med Compliant: Yes   Accu-Chek: No   Oxygen: No  CIWA/CINA: No          SI denies suicidal ideation    HI Negative for homicidal ideation        AVH:Absent      Depression: 4   Anxiety: 4       Appetite: decreased    Social: No   Speech: normal   Appearance: appropriately dressed, disheveled and healthy looking    Group Participation: No  Participation LevelMinimal    Participation QualityResistant    Notes: Patient calm and cooperative with staff and peers. Will continue to monitor.     Electronically signed by Kaitlin Ji RN on 4/7/2019 at 10:22 PM
Group Therapy Note        Date: 4/5/19  Start Time: 2100  End Time: 2130     Wrap-up Group Session:  Patient did not participate in wrap-up group session, nurse notified and staff attempted to encourage patient participation without success.       Discipline Responsible: Behavioral Health Tech      Signature:  Danelle Barrett
Group Therapy Note        Date: 4/6/19  Start Time:2030   End Time:2100      Wrap-up Group Session:  Patient did not participate in wrap-up group session, nurse notified and staff attempted to encourage patient participation without success.       Discipline Responsible: Behavioral Health Tech      Signature:  Bia Cosme
Group Therapy Note        Date: 4/7/19  Start Time:2000   End Time: 2030     Wrap-up Group Session:  Patient did not participate in wrap-up group session, nurse notified and staff attempted to encourage patient participation without success.       Discipline Responsible: Behavioral Health Tech      Signature:  Ruby Will
Group Therapy Note    Start Time: 0900  End Time:  0930  Number of Participants: 12    Type of Group: Community Meeting       Patient's Goal:        Notes:  Pt didn't set goals on this day. Participation Level:  Active Listener       Participation Quality: Appropriate      Thought Process/Content: Logical      Affective Functioning: Congruent      Mood: Calm      Level of consciousness:  Alert      Modes of Intervention: Support      Discipline Responsible: Behavioral Health Tech II      Signature:  Joel Trevizo
Group Therapy Note    Start Time: 8:30  End Time:  9:00  Number of Participants: 13    Type of Group: Community Meeting       Patient's Goal:  \"DAVID\"      Notes:      Participation Level:  Active Listener       Participation Quality: Appropriate      Thought Process/Content: Logical      Affective Functioning: Congruent      Mood: calm      Level of consciousness:  Alert      Modes of Intervention: Support      Discipline Responsible: Behavioral Health Tech II      Signature:  Diaomnd Ortega
Group Therapy Note     Date: 4/6/2019  Start Time: 1600  End Time:  1630  Number of Participants: 11     Type of Group: Healthy Living/Wellness     Notes:  Medication education     Status After Intervention:  Improved     Participation Level:  Active Listener     Participation Quality: Appropriate and Attentive        Speech:  normal        Thought Process/Content: Logical  Linear        Affective Functioning: Congruent        Mood: anxious        Level of consciousness:  Oriented x4        Response to Learning: Able to verbalize current knowledge/experience, Able to verbalize/acknowledge new learning, Able to retain information and Capable of insight        Modes of Intervention: Education        Discipline Responsible: Registered Nurse        Signature:  Chele Jasso RN   
Group Therapy Note     Date: 4/7/2019  Start Time: 16:00  End Time:  16:30  Number of Participants: 13     Type of Group: Healthy Living/Wellness     Wellness Binder Information  Module Name:  Medication Education  Session Number:  2     Patient's Goal:  Learn about patient's meds     Notes:  participated     Status After Intervention:  Improved     Participation Level:  Active Listener     Participation Quality: Appropriate and Attentive        Speech:  normal        Thought Process/Content: Logical  Linear        Affective Functioning: Congruent        Mood: euthymic        Level of consciousness:  Alert, Oriented x4 and Attentive        Response to Learning: Progressing to goal        Endings: None Reported     Modes of Intervention: Education        Discipline Responsible: Registered Nurse        Signature:  Simon Barnes RN   
Patient is interviewed in the [facility]. He reports he slept 8 hours plus last night. Reports appetite is 100% at breakfast. He attended  group this am. He reports \" social anxiety\" and states \" I need something for it\" when ask about depression states, \" it's fine\" . He is searching numbers for a drug rehab. He reports his mood as \" good\". Rated depression at 8 and anxiety at 6-7 on scale of 10. He is anxious trying to find a rehab to go into today. He met with his provider and discharge is noted. All discharge instructions are given per Abdias Wayne RN. All valuables are returned in full. There are valuables in hospital security he will get as he is leaving. There are no home medications here.
Placed follow up call and left a voice mail for patient
Progress Note  Arley Lui  4/6/2019 10:14 PM  Subjective:   Admit Date:   4/5/2019      CC/ADMIT DX:       Interval History:   Reviewed overnight events and nursing notes. No new physical complaints. I have reviewed all labs/diagnostics from the last 24hrs. ROS:   I have done a 10 point ROS and all are negative, except what is mentioned in the HPI. DIET GENERAL;    Medications:      risperiDONE  0.5 mg Oral BID    benztropine  0.5 mg Oral BID           Objective:   Vitals: /65   Pulse 81   Temp 97.7 °F (36.5 °C) (Temporal)   Resp 20   Ht 5' 11\" (1.803 m)   Wt 150 lb (68 kg)   SpO2 99%   BMI 20.92 kg/m²  No intake or output data in the 24 hours ending 04/06/19 2214  General appearance: alert and cooperative with exam  Lungs: clear to auscultation bilaterally  Heart: RRR  Abdomen: soft, non-tender; bowel sounds normal; no masses,  no organomegaly  Extremities: extremities normal, atraumatic, no cyanosis or edema  Neurologic:  No obvious focal neurologic deficits. Assessment and Plan: Active Problems:    Methamphetamine dependence (HCC)  Resolved Problems:    * No resolved hospital problems. *      Plan:  1. Continue present medication(s)   2. He is medically stable. I will follow for any changes or concerns. 3.  Follow with Psych      Discharge planning:   home     Reviewed treatment plans with the patient and/or family.              Electronically signed by Randell Hodgson MD on 4/6/2019 at 10:14 PM
Progress Note  Faustina Ulloa  4/7/2019 10:05 PM  Subjective:   Admit Date:   4/5/2019      CC/ADMIT DX:       Interval History:   Reviewed overnight events and nursing notes. He has had no new medical concerns. I have reviewed all labs/diagnostics from the last 24hrs. ROS:   I have done a 10 point ROS and all are negative, except what is mentioned in the HPI. DIET GENERAL;    Medications:      risperiDONE  0.5 mg Oral BID    benztropine  0.5 mg Oral BID           Objective:   Vitals: /68   Pulse 84   Temp 98.1 °F (36.7 °C) (Temporal)   Resp 20   Ht 5' 11\" (1.803 m)   Wt 150 lb (68 kg)   SpO2 97%   BMI 20.92 kg/m²  No intake or output data in the 24 hours ending 04/07/19 2205  General appearance: alert and cooperative with exam  Lungs: clear to auscultation bilaterally  Heart: RRR  Abdomen: soft, non-tender; bowel sounds normal; no masses,  no organomegaly  Extremities: extremities normal, atraumatic, no cyanosis or edema  Neurologic:  No obvious focal neurologic deficits. Assessment and Plan: Active Problems:    Methamphetamine dependence (HCC)  Resolved Problems:    * No resolved hospital problems. *    Vit D Def    Plan:  1. Continue present medication(s)   2. Replace Vit D  3. Follow with Psych      Discharge planning:   home     Reviewed treatment plans with the patient and/or family.              Electronically signed by Ruddy Reza MD on 4/7/2019 at 10:05 PM
Refused to go to nursing group
Treatment Team Note:    LCSW met with [address] [address] team to discuss Pts [address] plans. Progress/Behavior/Group Attendance: TBD    Target Symptoms/Reason for admission: psychosis    Diagnoses: drug-induced psychotic disorder    UDS: Amphetamines- THC     BAL: Neg    AftercarePlan: [address] lives with: SW will meet with pt to gather information. Collateral obtained from: SW will meet with pt to gather release of information.   On:    Family Session: TBA    Misc:
eyes)  Present Homicidal Ideation: (CHANTAL, pt would not respond or answer assessment questions. would not open his eyes)  Sleep: Yes, Very good, sleeps excessively Hours Slept: see flow sheet in AM Sched Sleep Meds: No PRN Sleep Meds: No Other PRN Meds: No Med Compliant: Yes Appetite: good Percent Meals: 100% Social: No ADLs: No Speech: mute, would not speak to this RN Depression: CHANTAL, pt would not respond or answer assessment questions. would not open his eyes Anxiety: CHANTAL, pt would not respond or answer assessment questions. would not open his eyes    Pt asleep/resting in bed all PM, isolative. When pt approached by signee for assessment and HS meds, pt would not open his eyes or respond to this RN although his left foot was shaking up and down. He opened his eyes very briefly then closed them, continued to ignore this RN and was uncooperative with assessment questions. Pt had to be prompted 3 times to sit up and take his night meds. Pt sat up abruptly and took them, but in an irritable manner. However, pt then thanked signee.     Jace Mathews RN